# Patient Record
Sex: FEMALE | Race: WHITE | NOT HISPANIC OR LATINO | Employment: FULL TIME | URBAN - METROPOLITAN AREA
[De-identification: names, ages, dates, MRNs, and addresses within clinical notes are randomized per-mention and may not be internally consistent; named-entity substitution may affect disease eponyms.]

---

## 2023-10-30 ENCOUNTER — TELEPHONE (OUTPATIENT)
Dept: PRIMARY CARE | Facility: CLINIC | Age: 43
End: 2023-10-30
Payer: COMMERCIAL

## 2023-10-30 DIAGNOSIS — Z00.00 WELL ADULT EXAM: Primary | ICD-10-CM

## 2023-11-27 ENCOUNTER — TELEPHONE (OUTPATIENT)
Dept: PRIMARY CARE | Facility: CLINIC | Age: 43
End: 2023-11-27
Payer: COMMERCIAL

## 2023-11-27 DIAGNOSIS — J45.20 MILD INTERMITTENT ASTHMA WITHOUT COMPLICATION (HHS-HCC): Primary | ICD-10-CM

## 2023-11-27 RX ORDER — MONTELUKAST SODIUM 10 MG/1
10 TABLET ORAL DAILY
COMMUNITY
End: 2023-11-27 | Stop reason: SDUPTHER

## 2023-11-27 RX ORDER — MONTELUKAST SODIUM 10 MG/1
10 TABLET ORAL DAILY
Qty: 90 TABLET | Refills: 0 | Status: SHIPPED | OUTPATIENT
Start: 2023-11-27 | End: 2023-12-15 | Stop reason: SDUPTHER

## 2023-11-27 NOTE — TELEPHONE ENCOUNTER
Requesting refill of montelukast 10 mg be sent to [facility], going out of town tomorrow and would like it sent today

## 2023-12-15 ENCOUNTER — OFFICE VISIT (OUTPATIENT)
Dept: PRIMARY CARE | Facility: CLINIC | Age: 43
End: 2023-12-15
Payer: COMMERCIAL

## 2023-12-15 VITALS
TEMPERATURE: 98.1 F | HEART RATE: 89 BPM | DIASTOLIC BLOOD PRESSURE: 82 MMHG | WEIGHT: 293 LBS | BODY MASS INDEX: 45.99 KG/M2 | SYSTOLIC BLOOD PRESSURE: 128 MMHG | OXYGEN SATURATION: 99 % | HEIGHT: 67 IN

## 2023-12-15 DIAGNOSIS — Z12.31 ENCOUNTER FOR SCREENING MAMMOGRAM FOR MALIGNANT NEOPLASM OF BREAST: ICD-10-CM

## 2023-12-15 DIAGNOSIS — Z00.00 ANNUAL PHYSICAL EXAM: Primary | ICD-10-CM

## 2023-12-15 DIAGNOSIS — Z80.3 FAMILY HISTORY OF BREAST CANCER: ICD-10-CM

## 2023-12-15 DIAGNOSIS — Z30.9 ENCOUNTER FOR CONTRACEPTIVE MANAGEMENT, UNSPECIFIED TYPE: ICD-10-CM

## 2023-12-15 DIAGNOSIS — F41.1 GENERALIZED ANXIETY DISORDER: ICD-10-CM

## 2023-12-15 DIAGNOSIS — Z23 ENCOUNTER FOR IMMUNIZATION: ICD-10-CM

## 2023-12-15 DIAGNOSIS — J45.20 MILD INTERMITTENT ASTHMA WITHOUT COMPLICATION (HHS-HCC): ICD-10-CM

## 2023-12-15 PROCEDURE — 90715 TDAP VACCINE 7 YRS/> IM: CPT

## 2023-12-15 PROCEDURE — 1036F TOBACCO NON-USER: CPT

## 2023-12-15 PROCEDURE — 90471 IMMUNIZATION ADMIN: CPT

## 2023-12-15 PROCEDURE — 99396 PREV VISIT EST AGE 40-64: CPT

## 2023-12-15 RX ORDER — NORETHINDRONE AND ETHINYL ESTRADIOL 1 MG-35MCG
1 KIT ORAL DAILY
Qty: 84 TABLET | Refills: 0 | Status: SHIPPED | OUTPATIENT
Start: 2023-12-15

## 2023-12-15 RX ORDER — BUPROPION HYDROCHLORIDE 300 MG/1
TABLET ORAL EVERY 24 HOURS
COMMUNITY
Start: 2023-06-12 | End: 2023-12-15 | Stop reason: SDUPTHER

## 2023-12-15 RX ORDER — CITALOPRAM 10 MG/1
10 TABLET ORAL DAILY
Qty: 90 TABLET | Refills: 3 | Status: SHIPPED | OUTPATIENT
Start: 2023-12-15

## 2023-12-15 RX ORDER — BUPROPION HYDROCHLORIDE 300 MG/1
300 TABLET ORAL EVERY 24 HOURS
Qty: 30 TABLET | Refills: 11 | Status: SHIPPED | OUTPATIENT
Start: 2023-12-15

## 2023-12-15 RX ORDER — CETIRIZINE HYDROCHLORIDE 10 MG/1
10 TABLET ORAL
COMMUNITY
Start: 2012-09-24

## 2023-12-15 RX ORDER — NORETHINDRONE AND ETHINYL ESTRADIOL 1 MG-35MCG
1 KIT ORAL DAILY
COMMUNITY
End: 2023-12-15 | Stop reason: SDUPTHER

## 2023-12-15 RX ORDER — CITALOPRAM 10 MG/1
TABLET ORAL
COMMUNITY
Start: 2014-07-14 | End: 2023-12-15 | Stop reason: SDUPTHER

## 2023-12-15 RX ORDER — MONTELUKAST SODIUM 10 MG/1
10 TABLET ORAL DAILY
Qty: 90 TABLET | Refills: 3 | Status: SHIPPED | OUTPATIENT
Start: 2023-12-15

## 2023-12-15 ASSESSMENT — PROMIS GLOBAL HEALTH SCALE
RATE_GENERAL_HEALTH: VERY GOOD
RATE_AVERAGE_FATIGUE: MODERATE
RATE_SOCIAL_SATISFACTION: VERY GOOD
RATE_MENTAL_HEALTH: GOOD
RATE_QUALITY_OF_LIFE: GOOD
RATE_PHYSICAL_HEALTH: GOOD
EMOTIONAL_PROBLEMS: OFTEN
RATE_AVERAGE_PAIN: 3
CARRYOUT_SOCIAL_ACTIVITIES: VERY GOOD
CARRYOUT_PHYSICAL_ACTIVITIES: COMPLETELY

## 2023-12-15 ASSESSMENT — PAIN SCALES - GENERAL: PAINLEVEL: 0-NO PAIN

## 2023-12-15 ASSESSMENT — PATIENT HEALTH QUESTIONNAIRE - PHQ9
1. LITTLE INTEREST OR PLEASURE IN DOING THINGS: NOT AT ALL
SUM OF ALL RESPONSES TO PHQ9 QUESTIONS 1 AND 2: 0
2. FEELING DOWN, DEPRESSED OR HOPELESS: NOT AT ALL

## 2023-12-15 NOTE — PROGRESS NOTES
Subjective   Patient ID: Madelyn Arias is a 43 y.o. female who presents for Annual Exam (Sees OB ).    HPI   Madelyn presents for her annual physical exam. No new concerns at this visit.  No recent hospitalizations or illness.     Patient's recent visit notes, medication and allergy lists, past medical surgical social hx, immunization, vitals, problem list, recent tests were reviewed by me for pertinence to this visit.    PMH:  Mild intermittent asthma- environmental allergy related, Singular and Zyrtec daily, Proair as needed- used 1-2 times per year    MARIANA- Using Celexa and Wellbutrin XL for anxiety control, works well per patient without any side effects     Social Hx:  , one son  Smoking:  Former, quit   Alcohol: Rarely  Recreational drug use: Denies      Screening tools:  EK, normal  Mammogram: UTD 2023  Mother breast cancer (stage 2 in remission now). Patient does not currently meet NCCN criteria. Tyrer-Cuzick risk score is 27.1%. She is getting Mamm/MRI breast q 6 months.     PAP: GYN- sees [facility] provider. Pap  and was normal. She will make appointment for repeat pap/HPV as she is over due.     Vaccinations:  Tdap: Due 2023  Flu Vaccine: UTD 2023      Review of Systems   Constitutional:  Negative for activity change, appetite change, chills, fatigue, fever and unexpected weight change.   HENT:  Negative for dental problem, ear pain, hearing loss, mouth sores, nosebleeds, sinus pressure, sinus pain, sore throat and trouble swallowing.    Eyes:  Negative for photophobia, pain and visual disturbance.   Respiratory:  Negative for cough, chest tightness, shortness of breath and wheezing.    Cardiovascular:  Negative for chest pain, palpitations and leg swelling.   Gastrointestinal:  Negative for abdominal pain, blood in stool, constipation, diarrhea, nausea and vomiting.   Endocrine: Negative for cold intolerance, heat intolerance, polydipsia, polyphagia and polyuria.  "  Genitourinary:  Negative for decreased urine volume, dysuria, frequency and menstrual problem.   Musculoskeletal:  Negative for arthralgias, gait problem, joint swelling and myalgias.   Skin:  Negative for color change, rash and wound.   Allergic/Immunologic: Negative for environmental allergies and food allergies.   Neurological:  Negative for dizziness, seizures, syncope, speech difficulty, weakness, numbness and headaches.   Hematological:  Negative for adenopathy. Does not bruise/bleed easily.   Psychiatric/Behavioral:  Negative for behavioral problems, decreased concentration, self-injury, sleep disturbance and suicidal ideas. The patient is not nervous/anxious and is not hyperactive.          Objective   Blood Pressure 128/82 (BP Location: Left arm)   Pulse 89   Temperature 36.7 °C (98.1 °F) (Temporal)   Height 1.702 m (5' 7\")   Weight 137 kg (302 lb)   Oxygen Saturation 99%   Body Mass Index 47.30 kg/m²     Physical Exam  Vitals and nursing note reviewed.   Constitutional:       General: She is not in acute distress.     Appearance: Normal appearance. She is well-developed and well-groomed. She is morbidly obese. She is not ill-appearing.   HENT:      Head: Normocephalic.      Jaw: There is normal jaw occlusion.      Right Ear: Hearing, tympanic membrane, ear canal and external ear normal.      Left Ear: Hearing, tympanic membrane, ear canal and external ear normal.      Nose: Nose normal.      Mouth/Throat:      Lips: Pink.      Mouth: Mucous membranes are moist.      Pharynx: Oropharynx is clear. Uvula midline.   Eyes:      General: Lids are normal. Vision grossly intact. Gaze aligned appropriately.      Extraocular Movements: Extraocular movements intact.      Conjunctiva/sclera: Conjunctivae normal.      Pupils: Pupils are equal, round, and reactive to light.   Neck:      Thyroid: No thyromegaly.      Vascular: No carotid bruit or JVD.      Trachea: Trachea and phonation normal.   Cardiovascular: "      Rate and Rhythm: Normal rate and regular rhythm.      Pulses: Normal pulses.      Heart sounds: Normal heart sounds, S1 normal and S2 normal.   Pulmonary:      Effort: Pulmonary effort is normal.      Breath sounds: Normal breath sounds and air entry.   Abdominal:      General: Bowel sounds are normal. There is no distension.      Palpations: Abdomen is soft. There is no hepatomegaly, splenomegaly or mass.      Tenderness: There is no abdominal tenderness. There is no right CVA tenderness, left CVA tenderness or guarding.   Musculoskeletal:         General: Normal range of motion.      Cervical back: Normal, full passive range of motion without pain, normal range of motion and neck supple.      Thoracic back: Normal.      Lumbar back: Normal.   Lymphadenopathy:      Head:      Right side of head: No submental, submandibular, tonsillar, preauricular, posterior auricular or occipital adenopathy.      Left side of head: No submental, submandibular, tonsillar, preauricular, posterior auricular or occipital adenopathy.      Cervical: No cervical adenopathy.      Right cervical: No superficial or posterior cervical adenopathy.     Left cervical: No superficial or posterior cervical adenopathy.      Upper Body:      Right upper body: No supraclavicular adenopathy.      Left upper body: No supraclavicular adenopathy.   Skin:     General: Skin is warm and dry.      Capillary Refill: Capillary refill takes less than 2 seconds.   Neurological:      General: No focal deficit present.      Mental Status: She is alert and oriented to person, place, and time.      Cranial Nerves: Cranial nerves 2-12 are intact.      Sensory: Sensation is intact.      Motor: Motor function is intact.      Coordination: Coordination is intact.      Gait: Gait is intact.   Psychiatric:         Attention and Perception: Attention and perception normal.         Mood and Affect: Mood and affect normal.         Speech: Speech normal.          Behavior: Behavior normal. Behavior is cooperative.         Thought Content: Thought content normal.         Cognition and Memory: Cognition normal.         Judgment: Judgment normal.           Assessment/Plan   Problem List Items Addressed This Visit    None  Visit Diagnoses       Diagnosis Codes    Annual physical exam    -  Primary  Well adult exam.  1. Preventative measures reviewed.   2. Encouraged healthy diet and exercise.  3. Immunizations-Reviewed and updated at this visit  4. Labs- reviewed with patient  5. Medications-  Reviewed and updated    *Follow-up in 1 year for repeat annual physical exam. Patient verbalizes understanding  regarding plan of care and all questions answered.   Z00.00    Generalized anxiety disorder      Anxiety well controlled. Refills sent to pharmacy.   Discussed medication desired effects, potential side effects, and how to administer the medication.   Discussed non-pharmacological interventions such seeing a therapist, stress reduction, diet, exercise, and sleep.   Follow up in 6 months or sooner if needed.   Patient verbalizes understanding regarding plan of care and all questions answered.   F41.1    Relevant Medications    buPROPion XL (Wellbutrin XL) 300 mg 24 hr tablet    citalopram (CeleXA) 10 mg tablet    Mild intermittent asthma without complication     J45.20        Chronic condition, stable  Refills sent to pharmacy.   Discussed medication desired effects, potential side effects, and how to administer the medication.   Follow up 6 months or sooner if needed.   Patient verbalizes understanding regarding plan of care and all questions answered.      montelukast (Singulair) 10 mg tablet    Encounter for screening mammogram for malignant neoplasm of breast     Z12.31    Relevant Orders    BI mammo bilateral screening tomosynthesis    MR breast bilateral w contrast full protocol    Family history of breast cancer     Z80.3    Relevant Orders    MR breast bilateral w contrast  full protocol    Encounter for immunization     Z23    Relevant Orders    Tdap vaccine, age 7 years and older  (BOOSTRIX) (Completed)    Encounter for contraceptive management, unspecified type     Z30.9    Relevant Medications    Alyacen 1/35, 28, 1-35 mg-mcg tablet

## 2023-12-17 ASSESSMENT — ENCOUNTER SYMPTOMS
HYPERACTIVE: 0
SINUS PRESSURE: 0
APPETITE CHANGE: 0
NERVOUS/ANXIOUS: 0
MYALGIAS: 0
DIZZINESS: 0
CHILLS: 0
WEAKNESS: 0
VOMITING: 0
CONSTIPATION: 0
SHORTNESS OF BREATH: 0
DIARRHEA: 0
TROUBLE SWALLOWING: 0
UNEXPECTED WEIGHT CHANGE: 0
SEIZURES: 0
POLYPHAGIA: 0
WHEEZING: 0
JOINT SWELLING: 0
COLOR CHANGE: 0
HEADACHES: 0
SPEECH DIFFICULTY: 0
PALPITATIONS: 0
SLEEP DISTURBANCE: 0
DECREASED CONCENTRATION: 0
ABDOMINAL PAIN: 0
ADENOPATHY: 0
WOUND: 0
SINUS PAIN: 0
ACTIVITY CHANGE: 0
FATIGUE: 0
BRUISES/BLEEDS EASILY: 0
NAUSEA: 0
NUMBNESS: 0
EYE PAIN: 0
BLOOD IN STOOL: 0
POLYDIPSIA: 0
SORE THROAT: 0
COUGH: 0
FEVER: 0
ARTHRALGIAS: 0
DYSURIA: 0
PHOTOPHOBIA: 0
FREQUENCY: 0
CHEST TIGHTNESS: 0

## 2023-12-17 ASSESSMENT — VISUAL ACUITY: OU: 1

## 2023-12-19 ENCOUNTER — LAB (OUTPATIENT)
Dept: LAB | Facility: LAB | Age: 43
End: 2023-12-19
Payer: COMMERCIAL

## 2023-12-19 DIAGNOSIS — R79.89 ABNORMAL CBC: ICD-10-CM

## 2023-12-19 DIAGNOSIS — Z00.00 WELL ADULT EXAM: ICD-10-CM

## 2023-12-19 DIAGNOSIS — R79.89 ABNORMAL CBC: Primary | ICD-10-CM

## 2023-12-19 LAB
ALBUMIN SERPL-MCNC: 3.8 G/DL (ref 3.5–5)
ALP BLD-CCNC: 98 U/L (ref 35–125)
ALT SERPL-CCNC: 13 U/L (ref 5–40)
ANION GAP SERPL CALC-SCNC: 14 MMOL/L
APPEARANCE UR: ABNORMAL
AST SERPL-CCNC: 14 U/L (ref 5–40)
BACTERIA #/AREA URNS AUTO: ABNORMAL /HPF
BASOPHILS # BLD AUTO: 0.07 X10*3/UL (ref 0–0.1)
BASOPHILS NFR BLD AUTO: 1 %
BILIRUB SERPL-MCNC: <0.2 MG/DL (ref 0.1–1.2)
BILIRUB UR STRIP.AUTO-MCNC: NEGATIVE MG/DL
BUN SERPL-MCNC: 13 MG/DL (ref 8–25)
CALCIUM SERPL-MCNC: 8.9 MG/DL (ref 8.5–10.4)
CHLORIDE SERPL-SCNC: 104 MMOL/L (ref 97–107)
CHOLEST SERPL-MCNC: 184 MG/DL (ref 133–200)
CHOLEST/HDLC SERPL: 3.1 {RATIO}
CO2 SERPL-SCNC: 21 MMOL/L (ref 24–31)
COLOR UR: ABNORMAL
CREAT SERPL-MCNC: 1 MG/DL (ref 0.4–1.6)
EOSINOPHIL # BLD AUTO: 0.47 X10*3/UL (ref 0–0.7)
EOSINOPHIL NFR BLD AUTO: 6.6 %
ERYTHROCYTE [DISTWIDTH] IN BLOOD BY AUTOMATED COUNT: 15.7 % (ref 11.5–14.5)
GFR SERPL CREATININE-BSD FRML MDRD: 72 ML/MIN/1.73M*2
GLUCOSE SERPL-MCNC: 101 MG/DL (ref 65–99)
GLUCOSE UR STRIP.AUTO-MCNC: NORMAL MG/DL
HCT VFR BLD AUTO: 39.2 % (ref 36–46)
HDLC SERPL-MCNC: 59 MG/DL
HGB BLD-MCNC: 12 G/DL (ref 12–16)
IMM GRANULOCYTES # BLD AUTO: 0.01 X10*3/UL (ref 0–0.7)
IMM GRANULOCYTES NFR BLD AUTO: 0.1 % (ref 0–0.9)
IRON SATN MFR SERPL: 8 % (ref 12–50)
IRON SERPL-MCNC: 29 UG/DL (ref 30–160)
KETONES UR STRIP.AUTO-MCNC: NEGATIVE MG/DL
LDLC SERPL CALC-MCNC: 107 MG/DL (ref 65–130)
LEUKOCYTE ESTERASE UR QL STRIP.AUTO: ABNORMAL
LYMPHOCYTES # BLD AUTO: 1.79 X10*3/UL (ref 1.2–4.8)
LYMPHOCYTES NFR BLD AUTO: 25.3 %
MCH RBC QN AUTO: 24.1 PG (ref 26–34)
MCHC RBC AUTO-ENTMCNC: 30.6 G/DL (ref 32–36)
MCV RBC AUTO: 79 FL (ref 80–100)
MONOCYTES # BLD AUTO: 0.63 X10*3/UL (ref 0.1–1)
MONOCYTES NFR BLD AUTO: 8.9 %
MUCOUS THREADS #/AREA URNS AUTO: ABNORMAL /LPF
NEUTROPHILS # BLD AUTO: 4.11 X10*3/UL (ref 1.2–7.7)
NEUTROPHILS NFR BLD AUTO: 58.1 %
NITRITE UR QL STRIP.AUTO: NEGATIVE
NRBC BLD-RTO: 0 /100 WBCS (ref 0–0)
PH UR STRIP.AUTO: 5.5 [PH]
PLATELET # BLD AUTO: 237 X10*3/UL (ref 150–450)
POTASSIUM SERPL-SCNC: 4.4 MMOL/L (ref 3.4–5.1)
PROT SERPL-MCNC: 6.2 G/DL (ref 5.9–7.9)
PROT UR STRIP.AUTO-MCNC: ABNORMAL MG/DL
RBC # BLD AUTO: 4.97 X10*6/UL (ref 4–5.2)
RBC # UR STRIP.AUTO: ABNORMAL /UL
RBC #/AREA URNS AUTO: ABNORMAL /HPF
SODIUM SERPL-SCNC: 139 MMOL/L (ref 133–145)
SP GR UR STRIP.AUTO: 1.03
SQUAMOUS #/AREA URNS AUTO: ABNORMAL /HPF
TIBC SERPL-MCNC: 379 UG/DL (ref 228–428)
TRIGL SERPL-MCNC: 88 MG/DL (ref 40–150)
UIBC SERPL-MCNC: 350 UG/DL (ref 110–370)
UROBILINOGEN UR STRIP.AUTO-MCNC: NORMAL MG/DL
WBC # BLD AUTO: 7.1 X10*3/UL (ref 4.4–11.3)
WBC #/AREA URNS AUTO: ABNORMAL /HPF
WBC CLUMPS #/AREA URNS AUTO: ABNORMAL /HPF

## 2023-12-19 PROCEDURE — 85025 COMPLETE CBC W/AUTO DIFF WBC: CPT

## 2023-12-19 PROCEDURE — 83550 IRON BINDING TEST: CPT

## 2023-12-19 PROCEDURE — 80053 COMPREHEN METABOLIC PANEL: CPT

## 2023-12-19 PROCEDURE — 36415 COLL VENOUS BLD VENIPUNCTURE: CPT

## 2023-12-19 PROCEDURE — 80061 LIPID PANEL: CPT

## 2023-12-19 PROCEDURE — 83540 ASSAY OF IRON: CPT

## 2023-12-19 PROCEDURE — 81001 URINALYSIS AUTO W/SCOPE: CPT

## 2024-01-19 PROCEDURE — 88141 CYTOPATH C/V INTERPRET: CPT | Performed by: PATHOLOGY

## 2024-01-19 PROCEDURE — 88142 CYTOPATH C/V THIN LAYER: CPT

## 2024-01-23 ENCOUNTER — LAB REQUISITION (OUTPATIENT)
Dept: LAB | Facility: HOSPITAL | Age: 44
End: 2024-01-23
Payer: COMMERCIAL

## 2024-01-23 DIAGNOSIS — Z01.419 ENCOUNTER FOR GYNECOLOGICAL EXAMINATION (GENERAL) (ROUTINE) WITHOUT ABNORMAL FINDINGS: ICD-10-CM

## 2024-01-23 DIAGNOSIS — Z11.51 ENCOUNTER FOR SCREENING FOR HUMAN PAPILLOMAVIRUS (HPV): ICD-10-CM

## 2024-01-23 DIAGNOSIS — Z12.4 ENCOUNTER FOR SCREENING FOR MALIGNANT NEOPLASM OF CERVIX: ICD-10-CM

## 2024-02-07 LAB
CYTOLOGY CMNT CVX/VAG CYTO-IMP: NORMAL
LAB AP HPV GENOTYPE QUESTION: YES
LAB AP HPV HR: NORMAL
LABORATORY COMMENT REPORT: NORMAL
LABORATORY COMMENT REPORT: NORMAL
LMP START DATE: NORMAL
PATH REPORT.TOTAL CANCER: NORMAL

## 2024-03-25 ENCOUNTER — APPOINTMENT (OUTPATIENT)
Dept: RADIOLOGY | Facility: HOSPITAL | Age: 44
End: 2024-03-25
Payer: COMMERCIAL

## 2024-04-05 ENCOUNTER — HOSPITAL ENCOUNTER (OUTPATIENT)
Dept: RADIOLOGY | Facility: HOSPITAL | Age: 44
Discharge: HOME | End: 2024-04-05
Payer: COMMERCIAL

## 2024-04-05 VITALS — WEIGHT: 293 LBS | BODY MASS INDEX: 45.99 KG/M2 | HEIGHT: 67 IN

## 2024-04-05 DIAGNOSIS — Z12.31 ENCOUNTER FOR SCREENING MAMMOGRAM FOR MALIGNANT NEOPLASM OF BREAST: ICD-10-CM

## 2024-04-05 PROCEDURE — 77067 SCR MAMMO BI INCL CAD: CPT

## 2024-04-05 PROCEDURE — 77067 SCR MAMMO BI INCL CAD: CPT | Performed by: STUDENT IN AN ORGANIZED HEALTH CARE EDUCATION/TRAINING PROGRAM

## 2024-04-05 PROCEDURE — 77063 BREAST TOMOSYNTHESIS BI: CPT | Performed by: STUDENT IN AN ORGANIZED HEALTH CARE EDUCATION/TRAINING PROGRAM

## 2024-04-09 ENCOUNTER — TELEPHONE (OUTPATIENT)
Dept: PRIMARY CARE | Facility: CLINIC | Age: 44
End: 2024-04-09
Payer: COMMERCIAL

## 2024-04-09 NOTE — TELEPHONE ENCOUNTER
Patient called [phone] she received results of her mammogram and they are requesting additional testing, not sure what is needed

## 2024-04-22 ENCOUNTER — HOSPITAL ENCOUNTER (OUTPATIENT)
Dept: RADIOLOGY | Facility: HOSPITAL | Age: 44
Discharge: HOME | End: 2024-04-22
Payer: COMMERCIAL

## 2024-04-22 DIAGNOSIS — R92.8 OTHER ABNORMAL AND INCONCLUSIVE FINDINGS ON DIAGNOSTIC IMAGING OF BREAST: ICD-10-CM

## 2024-04-22 PROCEDURE — 77061 BREAST TOMOSYNTHESIS UNI: CPT | Mod: LT

## 2024-04-22 PROCEDURE — 77065 DX MAMMO INCL CAD UNI: CPT | Mod: LEFT SIDE | Performed by: RADIOLOGY

## 2024-04-22 PROCEDURE — 77061 BREAST TOMOSYNTHESIS UNI: CPT | Mod: LEFT SIDE | Performed by: RADIOLOGY

## 2024-10-24 ENCOUNTER — APPOINTMENT (OUTPATIENT)
Dept: RADIOLOGY | Facility: HOSPITAL | Age: 44
End: 2024-10-24
Payer: COMMERCIAL

## 2024-10-25 ENCOUNTER — TELEPHONE (OUTPATIENT)
Dept: PRIMARY CARE | Facility: CLINIC | Age: 44
End: 2024-10-25
Payer: COMMERCIAL

## 2024-10-25 DIAGNOSIS — Z00.00 ANNUAL PHYSICAL EXAM: ICD-10-CM

## 2024-10-25 NOTE — TELEPHONE ENCOUNTER
Ft 11/8/24  CPE    Patient is asking if tests for her cortizone and hormone levels can be added as well?

## 2024-11-01 ENCOUNTER — LAB (OUTPATIENT)
Dept: LAB | Facility: LAB | Age: 44
End: 2024-11-01
Payer: COMMERCIAL

## 2024-11-01 DIAGNOSIS — Z00.00 ANNUAL PHYSICAL EXAM: ICD-10-CM

## 2024-11-01 LAB
ALBUMIN SERPL BCP-MCNC: 3.6 G/DL (ref 3.4–5)
ALP SERPL-CCNC: 93 U/L (ref 33–110)
ALT SERPL W P-5'-P-CCNC: 19 U/L (ref 7–45)
ANION GAP SERPL CALCULATED.3IONS-SCNC: 11 MMOL/L (ref 10–20)
APPEARANCE UR: CLEAR
AST SERPL W P-5'-P-CCNC: 14 U/L (ref 9–39)
BASOPHILS # BLD AUTO: 0.08 X10*3/UL (ref 0–0.1)
BASOPHILS NFR BLD AUTO: 1 %
BILIRUB SERPL-MCNC: 0.4 MG/DL (ref 0–1.2)
BILIRUB UR STRIP.AUTO-MCNC: NEGATIVE MG/DL
BUN SERPL-MCNC: 12 MG/DL (ref 6–23)
CALCIUM SERPL-MCNC: 8.9 MG/DL (ref 8.6–10.3)
CHLORIDE SERPL-SCNC: 106 MMOL/L (ref 98–107)
CHOLEST SERPL-MCNC: 179 MG/DL (ref 0–199)
CHOLEST/HDLC SERPL: 3.3 {RATIO}
CO2 SERPL-SCNC: 26 MMOL/L (ref 21–32)
COLOR UR: YELLOW
CREAT SERPL-MCNC: 0.86 MG/DL (ref 0.5–1.05)
EGFRCR SERPLBLD CKD-EPI 2021: 86 ML/MIN/1.73M*2
EOSINOPHIL # BLD AUTO: 0.51 X10*3/UL (ref 0–0.7)
EOSINOPHIL NFR BLD AUTO: 6.2 %
ERYTHROCYTE [DISTWIDTH] IN BLOOD BY AUTOMATED COUNT: 14.7 % (ref 11.5–14.5)
EST. AVERAGE GLUCOSE BLD GHB EST-MCNC: 103 MG/DL
GLUCOSE SERPL-MCNC: 90 MG/DL (ref 74–99)
GLUCOSE UR STRIP.AUTO-MCNC: NORMAL MG/DL
HBA1C MFR BLD: 5.2 %
HCT VFR BLD AUTO: 36.9 % (ref 36–46)
HCV AB SER QL: NONREACTIVE
HDLC SERPL-MCNC: 53.8 MG/DL
HGB BLD-MCNC: 11.7 G/DL (ref 12–16)
IMM GRANULOCYTES # BLD AUTO: 0.02 X10*3/UL (ref 0–0.7)
IMM GRANULOCYTES NFR BLD AUTO: 0.2 % (ref 0–0.9)
KETONES UR STRIP.AUTO-MCNC: NEGATIVE MG/DL
LDLC SERPL CALC-MCNC: 103 MG/DL
LEUKOCYTE ESTERASE UR QL STRIP.AUTO: NEGATIVE
LYMPHOCYTES # BLD AUTO: 2.49 X10*3/UL (ref 1.2–4.8)
LYMPHOCYTES NFR BLD AUTO: 30.1 %
MCH RBC QN AUTO: 24.9 PG (ref 26–34)
MCHC RBC AUTO-ENTMCNC: 31.7 G/DL (ref 32–36)
MCV RBC AUTO: 79 FL (ref 80–100)
MONOCYTES # BLD AUTO: 0.47 X10*3/UL (ref 0.1–1)
MONOCYTES NFR BLD AUTO: 5.7 %
MUCOUS THREADS #/AREA URNS AUTO: ABNORMAL /LPF
NEUTROPHILS # BLD AUTO: 4.69 X10*3/UL (ref 1.2–7.7)
NEUTROPHILS NFR BLD AUTO: 56.8 %
NITRITE UR QL STRIP.AUTO: NEGATIVE
NON HDL CHOLESTEROL: 125 MG/DL (ref 0–149)
NRBC BLD-RTO: 0 /100 WBCS (ref 0–0)
PH UR STRIP.AUTO: 6 [PH]
PLATELET # BLD AUTO: 248 X10*3/UL (ref 150–450)
POTASSIUM SERPL-SCNC: 3.9 MMOL/L (ref 3.5–5.3)
PROT SERPL-MCNC: 6.1 G/DL (ref 6.4–8.2)
PROT UR STRIP.AUTO-MCNC: NORMAL MG/DL
RBC # BLD AUTO: 4.69 X10*6/UL (ref 4–5.2)
RBC # UR STRIP.AUTO: NEGATIVE /UL
RBC #/AREA URNS AUTO: ABNORMAL /HPF
SODIUM SERPL-SCNC: 139 MMOL/L (ref 136–145)
SP GR UR STRIP.AUTO: 1.02
SQUAMOUS #/AREA URNS AUTO: ABNORMAL /HPF
TRIGL SERPL-MCNC: 111 MG/DL (ref 0–149)
UROBILINOGEN UR STRIP.AUTO-MCNC: NORMAL MG/DL
VLDL: 22 MG/DL (ref 0–40)
WBC # BLD AUTO: 8.3 X10*3/UL (ref 4.4–11.3)
WBC #/AREA URNS AUTO: ABNORMAL /HPF

## 2024-11-01 PROCEDURE — 86803 HEPATITIS C AB TEST: CPT

## 2024-11-01 PROCEDURE — 80053 COMPREHEN METABOLIC PANEL: CPT

## 2024-11-01 PROCEDURE — 36415 COLL VENOUS BLD VENIPUNCTURE: CPT

## 2024-11-01 PROCEDURE — 81001 URINALYSIS AUTO W/SCOPE: CPT

## 2024-11-01 PROCEDURE — 85025 COMPLETE CBC W/AUTO DIFF WBC: CPT

## 2024-11-01 PROCEDURE — 80061 LIPID PANEL: CPT

## 2024-11-01 PROCEDURE — 83036 HEMOGLOBIN GLYCOSYLATED A1C: CPT

## 2024-11-05 ENCOUNTER — HOSPITAL ENCOUNTER (OUTPATIENT)
Dept: RADIOLOGY | Facility: HOSPITAL | Age: 44
Discharge: HOME | End: 2024-11-05
Payer: COMMERCIAL

## 2024-11-05 DIAGNOSIS — Z12.31 ENCOUNTER FOR SCREENING MAMMOGRAM FOR MALIGNANT NEOPLASM OF BREAST: ICD-10-CM

## 2024-11-05 DIAGNOSIS — Z80.3 FAMILY HISTORY OF BREAST CANCER: ICD-10-CM

## 2024-11-05 PROCEDURE — 77049 MRI BREAST C-+ W/CAD BI: CPT

## 2024-11-05 PROCEDURE — 2550000001 HC RX 255 CONTRASTS

## 2024-11-05 PROCEDURE — A9575 INJ GADOTERATE MEGLUMI 0.1ML: HCPCS

## 2024-11-05 PROCEDURE — 77049 MRI BREAST C-+ W/CAD BI: CPT | Performed by: STUDENT IN AN ORGANIZED HEALTH CARE EDUCATION/TRAINING PROGRAM

## 2024-11-05 RX ORDER — GADOTERATE MEGLUMINE 376.9 MG/ML
15 INJECTION INTRAVENOUS
Status: COMPLETED | OUTPATIENT
Start: 2024-11-05 | End: 2024-11-05

## 2024-11-05 RX ORDER — GADOTERATE MEGLUMINE 376.9 MG/ML
15 INJECTION INTRAVENOUS
Status: DISCONTINUED | OUTPATIENT
Start: 2024-11-05 | End: 2024-11-06 | Stop reason: HOSPADM

## 2024-11-08 ENCOUNTER — APPOINTMENT (OUTPATIENT)
Dept: PRIMARY CARE | Facility: CLINIC | Age: 44
End: 2024-11-08
Payer: COMMERCIAL

## 2024-11-08 ENCOUNTER — LAB (OUTPATIENT)
Dept: LAB | Facility: LAB | Age: 44
End: 2024-11-08
Payer: COMMERCIAL

## 2024-11-08 VITALS
DIASTOLIC BLOOD PRESSURE: 80 MMHG | BODY MASS INDEX: 45.99 KG/M2 | TEMPERATURE: 97.4 F | OXYGEN SATURATION: 98 % | SYSTOLIC BLOOD PRESSURE: 114 MMHG | HEIGHT: 67 IN | WEIGHT: 293 LBS | HEART RATE: 78 BPM

## 2024-11-08 DIAGNOSIS — E66.813 CLASS 3 SEVERE OBESITY WITH BODY MASS INDEX (BMI) OF 45.0 TO 49.9 IN ADULT, UNSPECIFIED OBESITY TYPE, UNSPECIFIED WHETHER SERIOUS COMORBIDITY PRESENT: ICD-10-CM

## 2024-11-08 DIAGNOSIS — E66.01 CLASS 3 SEVERE OBESITY WITH BODY MASS INDEX (BMI) OF 45.0 TO 49.9 IN ADULT, UNSPECIFIED OBESITY TYPE, UNSPECIFIED WHETHER SERIOUS COMORBIDITY PRESENT: ICD-10-CM

## 2024-11-08 DIAGNOSIS — D50.9 IRON DEFICIENCY ANEMIA, UNSPECIFIED IRON DEFICIENCY ANEMIA TYPE: ICD-10-CM

## 2024-11-08 DIAGNOSIS — F41.1 GENERALIZED ANXIETY DISORDER: ICD-10-CM

## 2024-11-08 DIAGNOSIS — J45.20 MILD INTERMITTENT ASTHMA WITHOUT COMPLICATION (HHS-HCC): ICD-10-CM

## 2024-11-08 DIAGNOSIS — Z00.00 WELL ADULT EXAM: Primary | ICD-10-CM

## 2024-11-08 DIAGNOSIS — R53.83 OTHER FATIGUE: ICD-10-CM

## 2024-11-08 PROBLEM — K52.9 GASTROENTERITIS: Status: RESOLVED | Noted: 2021-08-27 | Resolved: 2024-11-08

## 2024-11-08 PROBLEM — K04.7 PERIAPICAL ABSCESS: Status: RESOLVED | Noted: 2021-08-02 | Resolved: 2024-11-08

## 2024-11-08 PROBLEM — L03.312 CELLULITIS OF UPPER BACK EXCLUDING SCAPULAR REGION: Status: RESOLVED | Noted: 2022-11-23 | Resolved: 2024-11-08

## 2024-11-08 PROBLEM — J30.9 ALLERGIC RHINITIS: Status: RESOLVED | Noted: 2024-11-08 | Resolved: 2024-11-08

## 2024-11-08 PROBLEM — M79.671 PAIN IN RIGHT FOOT: Status: RESOLVED | Noted: 2020-11-19 | Resolved: 2024-11-08

## 2024-11-08 PROBLEM — M79.89 SWELLING OF RIGHT FOOT: Status: RESOLVED | Noted: 2020-11-19 | Resolved: 2024-11-08

## 2024-11-08 PROBLEM — R92.30 BREAST DENSITY: Status: RESOLVED | Noted: 2022-07-20 | Resolved: 2024-11-08

## 2024-11-08 PROBLEM — M54.40 CHRONIC LOW BACK PAIN WITH SCIATICA: Status: ACTIVE | Noted: 2021-12-14

## 2024-11-08 PROBLEM — L72.3 SEBACEOUS CYST: Status: RESOLVED | Noted: 2023-06-29 | Resolved: 2024-11-08

## 2024-11-08 PROBLEM — G89.29 CHRONIC LOW BACK PAIN WITH SCIATICA: Status: ACTIVE | Noted: 2021-12-14

## 2024-11-08 PROBLEM — J06.9 URI WITH COUGH AND CONGESTION: Status: RESOLVED | Noted: 2020-07-28 | Resolved: 2024-11-08
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
## 2024-11-08 PROBLEM — R05.8 DRY COUGH: Status: RESOLVED | Noted: 2019-01-04 | Resolved: 2024-11-08

## 2024-11-08 PROBLEM — B37.9 CANDIDIASIS: Status: RESOLVED | Noted: 2023-06-29 | Resolved: 2024-11-08

## 2024-11-08 PROBLEM — D64.9 ANEMIA: Status: ACTIVE | Noted: 2020-11-24

## 2024-11-08 PROBLEM — M54.9 BACKACHE: Status: RESOLVED | Noted: 2024-11-08 | Resolved: 2024-11-08

## 2024-11-08 PROBLEM — M54.41 ACUTE BILATERAL LOW BACK PAIN WITH RIGHT-SIDED SCIATICA: Status: RESOLVED | Noted: 2022-01-10 | Resolved: 2024-11-08

## 2024-11-08 PROBLEM — T81.89XA SUTURE GRANULOMA: Status: RESOLVED | Noted: 2024-11-08 | Resolved: 2024-11-08

## 2024-11-08 PROBLEM — J32.4 PANSINUSITIS: Status: RESOLVED | Noted: 2020-02-25 | Resolved: 2024-11-08

## 2024-11-08 PROBLEM — K04.7 TOOTH ABSCESS: Status: RESOLVED | Noted: 2021-08-02 | Resolved: 2024-11-08

## 2024-11-08 PROBLEM — J30.2 SEASONAL ALLERGIES: Status: ACTIVE | Noted: 2019-08-06

## 2024-11-08 PROBLEM — J40 BRONCHITIS: Status: RESOLVED | Noted: 2019-01-04 | Resolved: 2024-11-08

## 2024-11-08 PROBLEM — M79.9 DISORDER OF SOFT TISSUE: Status: ACTIVE | Noted: 2020-11-19

## 2024-11-08 PROBLEM — F33.8 SEASONAL DEPRESSION (CMS-HCC): Status: ACTIVE | Noted: 2018-10-02

## 2024-11-08 PROBLEM — J06.9 UPPER RESPIRATORY TRACT INFECTION: Status: RESOLVED | Noted: 2020-07-28 | Resolved: 2024-11-08

## 2024-11-08 PROBLEM — B37.9 YEAST INFECTION: Status: RESOLVED | Noted: 2023-06-29 | Resolved: 2024-11-08

## 2024-11-08 PROBLEM — L03.312 CELLULITIS OF SKIN OF BACK: Status: RESOLVED | Noted: 2022-11-23 | Resolved: 2024-11-08

## 2024-11-08 PROBLEM — R09.81 NASAL CONGESTION: Status: RESOLVED | Noted: 2019-08-06 | Resolved: 2024-11-08

## 2024-11-08 PROBLEM — S16.1XXA STRAIN OF NECK MUSCLE: Status: RESOLVED | Noted: 2024-11-08 | Resolved: 2024-11-08

## 2024-11-08 LAB
25(OH)D3 SERPL-MCNC: 60 NG/ML (ref 30–100)
FERRITIN SERPL-MCNC: 15 NG/ML (ref 8–150)
IRON SATN MFR SERPL: 12 % (ref 25–45)
IRON SERPL-MCNC: 48 UG/DL (ref 35–150)
TIBC SERPL-MCNC: 385 UG/DL (ref 240–445)
TSH SERPL-ACNC: 1.72 MIU/L (ref 0.44–3.98)
UIBC SERPL-MCNC: 337 UG/DL (ref 110–370)

## 2024-11-08 PROCEDURE — 83540 ASSAY OF IRON: CPT

## 2024-11-08 PROCEDURE — 83550 IRON BINDING TEST: CPT

## 2024-11-08 PROCEDURE — 1036F TOBACCO NON-USER: CPT

## 2024-11-08 PROCEDURE — 99396 PREV VISIT EST AGE 40-64: CPT

## 2024-11-08 PROCEDURE — 82306 VITAMIN D 25 HYDROXY: CPT

## 2024-11-08 PROCEDURE — 3008F BODY MASS INDEX DOCD: CPT

## 2024-11-08 PROCEDURE — 84443 ASSAY THYROID STIM HORMONE: CPT

## 2024-11-08 PROCEDURE — 82728 ASSAY OF FERRITIN: CPT

## 2024-11-08 PROCEDURE — 36415 COLL VENOUS BLD VENIPUNCTURE: CPT

## 2024-11-08 PROCEDURE — 99214 OFFICE O/P EST MOD 30 MIN: CPT

## 2024-11-08 RX ORDER — CYANOCOBALAMIN (VITAMIN B-12) 250 MCG
250 TABLET ORAL DAILY
COMMUNITY

## 2024-11-08 RX ORDER — MULTIVITAMIN
1 TABLET ORAL
COMMUNITY
End: 2024-11-08 | Stop reason: WASHOUT

## 2024-11-08 RX ORDER — CITALOPRAM 10 MG/1
10 TABLET ORAL DAILY
Qty: 90 TABLET | Refills: 1 | Status: SHIPPED | OUTPATIENT
Start: 2024-11-08

## 2024-11-08 RX ORDER — MONTELUKAST SODIUM 10 MG/1
10 TABLET ORAL DAILY
Qty: 90 TABLET | Refills: 1 | Status: SHIPPED | OUTPATIENT
Start: 2024-11-08

## 2024-11-08 RX ORDER — BUPROPION HYDROCHLORIDE 300 MG/1
300 TABLET ORAL EVERY 24 HOURS
Qty: 30 TABLET | Refills: 5 | Status: SHIPPED | OUTPATIENT
Start: 2024-11-08

## 2024-11-08 ASSESSMENT — ENCOUNTER SYMPTOMS
VOMITING: 0
NAUSEA: 0
RHINORRHEA: 0
COUGH: 0
MYALGIAS: 0
UNEXPECTED WEIGHT CHANGE: 0
FEVER: 0
FATIGUE: 1
ABDOMINAL PAIN: 0
CHILLS: 0
NUMBNESS: 0
NERVOUS/ANXIOUS: 0
SHORTNESS OF BREATH: 0
WEAKNESS: 0
ARTHRALGIAS: 0
DYSPHORIC MOOD: 0
TROUBLE SWALLOWING: 0
HEMATURIA: 0
BLOOD IN STOOL: 0
SORE THROAT: 0
HEADACHES: 0
DYSURIA: 0

## 2024-11-08 ASSESSMENT — PROMIS GLOBAL HEALTH SCALE
RATE_AVERAGE_FATIGUE: SEVERE
EMOTIONAL_PROBLEMS: SOMETIMES
RATE_QUALITY_OF_LIFE: GOOD
RATE_SOCIAL_SATISFACTION: EXCELLENT
RATE_GENERAL_HEALTH: GOOD
RATE_PHYSICAL_HEALTH: GOOD
RATE_MENTAL_HEALTH: VERY GOOD
RATE_AVERAGE_PAIN: 6
CARRYOUT_SOCIAL_ACTIVITIES: VERY GOOD
CARRYOUT_PHYSICAL_ACTIVITIES: MOSTLY

## 2024-11-08 ASSESSMENT — COLUMBIA-SUICIDE SEVERITY RATING SCALE - C-SSRS
6. HAVE YOU EVER DONE ANYTHING, STARTED TO DO ANYTHING, OR PREPARED TO DO ANYTHING TO END YOUR LIFE?: NO
1. IN THE PAST MONTH, HAVE YOU WISHED YOU WERE DEAD OR WISHED YOU COULD GO TO SLEEP AND NOT WAKE UP?: NO
2. HAVE YOU ACTUALLY HAD ANY THOUGHTS OF KILLING YOURSELF?: NO

## 2024-11-08 NOTE — PROGRESS NOTES
Subjective   Patient ID: Kim Arias is a 43 y.o. female who presents for Annual Exam (EKG 12-10-20/Pt has been feeling very fatigue for a couple months now ).    HPI   Madelyn Arias is seen for her comprehensive physical exam. PMH, SH, FH, medications were reviewed and updated.     CHRONIC ISSUES:   Anxiety: On Wellbutrin XL 300mg, Celexa 10mg. Happy with effects. Denies SI, HI, self-harm.     Mild intermittent asthma: Takes Singulair and Zyrtec daily, Albuterol PRN (1-2x/year).     HLD: On no medications. Recent . ASCVD 0.6%.      Fatigue over past few months. Sleeps about 7-8 hours a night. Snores, dose not wake up feeling rested. No nighttime awakenings.     IMMUNIZATIONS:   Immunization History   Administered Date(s) Administered    Flu vaccine (IIV4), preservative free *Check age/dose* 12/22/2022    Flu vaccine, quadrivalent, no egg protein, age 6 month or greater (FLUCELVAX) 11/04/2023    Flu vaccine, trivalent, preservative free, age 6 months and greater (Fluarix/Fluzone/Flulaval) 10/25/2024    HPV, Quadrivalent 09/15/2013    HPV, Unspecified 09/15/2013    Influenza, Unspecified 10/02/2012, 10/01/2013    Influenza, injectable, quadrivalent 11/25/2020, 12/10/2021    Influenza, seasonal, injectable 10/24/2014    Moderna COVID-19 vaccine, 12 years and older (50mcg/0.5mL)(Spikevax) 11/04/2023    Pfizer COVID-19 vaccine, 12 years and older, (30mcg/0.3mL) (Comirnaty) 10/25/2024    Pfizer COVID-19 vaccine, bivalent, age 12 years and older (30 mcg/0.3 mL) 12/22/2022    Pfizer Purple Cap SARS-CoV-2 03/09/2021, 04/08/2021, 12/07/2021    Tdap vaccine, age 7 year and older (BOOSTRIX, ADACEL) 02/06/2013, 12/31/2013, 12/15/2023        LUNG CANCER SCREENING (50-77 y.o. with 20+ pack year hx & current smoker or quit <15yrs ago)  Social History     Tobacco Use   Smoking Status Former    Current packs/day: 0.00    Average packs/day: 1 pack/day for 10.0 years (10.0 ttl pk-yrs)    Types: Cigarettes    Start  "date: 2003    Quit date: 2013    Years since quittin.0   Smokeless Tobacco Never       COLORECTAL SCREENING (From 45 or 10yrs younger than family diagnosis)  Last colonoscopy - Never   Next colonoscopy due - 45  Relevant FHx - None    GYN  LMP - 10/27/24, regular   Last Pap - , NILM  Next Pap due - , follows with GYN    MAMMOGRAM SCREENING (From age 40)   Last mammogram -   Next mammogram due -     Sees eye doctor annually.   Skin check 10/24    Review of Systems   Constitutional:  Positive for fatigue. Negative for chills, fever and unexpected weight change.   HENT:  Negative for congestion, ear pain, hearing loss, rhinorrhea, sore throat and trouble swallowing.    Eyes:  Negative for visual disturbance.   Respiratory:  Negative for cough and shortness of breath.    Cardiovascular:  Negative for chest pain and leg swelling.   Gastrointestinal:  Negative for abdominal pain, blood in stool, nausea and vomiting.   Genitourinary:  Negative for dysuria and hematuria.   Musculoskeletal:  Negative for arthralgias and myalgias.   Skin:  Negative for rash.   Neurological:  Negative for weakness, numbness and headaches.   Psychiatric/Behavioral:  Negative for dysphoric mood. The patient is not nervous/anxious.        Objective   /80 (BP Location: Left arm, Patient Position: Sitting, BP Cuff Size: Adult)   Pulse 78   Temp 36.3 °C (97.4 °F) (Temporal)   Ht 1.702 m (5' 7\")   Wt 135 kg (298 lb)   SpO2 98%   BMI 46.67 kg/m²     Physical Exam  Vitals and nursing note reviewed.   Constitutional:       General: She is not in acute distress.     Appearance: She is obese.   HENT:      Right Ear: Tympanic membrane and ear canal normal.      Left Ear: Tympanic membrane and ear canal normal.      Nose: Nose normal.      Mouth/Throat:      Mouth: Mucous membranes are moist.   Eyes:      Extraocular Movements: Extraocular movements intact.      Conjunctiva/sclera: Conjunctivae normal.      " Pupils: Pupils are equal, round, and reactive to light.   Neck:      Vascular: No carotid bruit.   Cardiovascular:      Rate and Rhythm: Normal rate and regular rhythm.   Pulmonary:      Effort: Pulmonary effort is normal.      Breath sounds: Normal breath sounds.   Abdominal:      General: Abdomen is flat.      Palpations: Abdomen is soft.      Tenderness: There is no abdominal tenderness.   Musculoskeletal:         General: Normal range of motion.      Cervical back: Neck supple.   Lymphadenopathy:      Cervical: No cervical adenopathy.   Skin:     General: Skin is warm.      Findings: No rash.   Neurological:      General: No focal deficit present.      Mental Status: She is alert.   Psychiatric:         Mood and Affect: Mood normal.       Assessment/Plan   Assessment & Plan  Well adult exam  Preventative measures discussed. Labs reviewed with patient. Immunizations discussed.          Generalized anxiety disorder  Chronic, stable. Continue Wellbutrin XL 300mg, Celexa 10mg daily.   Orders:    buPROPion XL (Wellbutrin XL) 300 mg 24 hr tablet; Take 1 tablet (300 mg) by mouth once every 24 hours.    citalopram (CeleXA) 10 mg tablet; Take 1 tablet (10 mg) by mouth once daily.    Mild intermittent asthma without complication (HHS-HCC)  Chronic, stable. Continue   Orders:    montelukast (Singulair) 10 mg tablet; Take 1 tablet (10 mg) by mouth once daily.    Other fatigue  VENANCIO vs hypothyroidism vs SHERRI. Labs ordered, will follow up with results.     Orders:    Iron and TIBC; Future    Tsh With Reflex To Free T4 If Abnormal; Future    Vitamin D 25-Hydroxy,Total (for eval of Vitamin D levels); Future    Iron deficiency anemia, unspecified iron deficiency anemia type  Recommend daily iron supplementation. Take with Vitamin C rich foods for better absorption. Miralax as needed for constipation. Additional labs ordered. Recheck in 3-6 months.     Orders:    Ferritin; Future    Class 3 severe obesity with body mass index (BMI)  of 45.0 to 49.9 in adult, unspecified obesity type, unspecified whether serious comorbidity present  Chronic. Discussed GLP-1 injections including side effects, benefits. Patient interested in medication. Will complete additional lab work and start medication if normal. Encouraged diet low in processed foods, fast foods, carbohydrates and increase in lean protein, vegetables and exercise.

## 2024-11-08 NOTE — ASSESSMENT & PLAN NOTE
Recommend daily iron supplementation. Take with Vitamin C rich foods for better absorption. Miralax as needed for constipation. Additional labs ordered. Recheck in 3-6 months.     Orders:    Ferritin; Future

## 2025-02-14 DIAGNOSIS — J45.20 MILD INTERMITTENT ASTHMA WITHOUT COMPLICATION (HHS-HCC): ICD-10-CM

## 2025-02-14 DIAGNOSIS — F41.1 GENERALIZED ANXIETY DISORDER: ICD-10-CM

## 2025-02-14 RX ORDER — CITALOPRAM 10 MG/1
10 TABLET ORAL DAILY
Qty: 90 TABLET | Refills: 0 | Status: SHIPPED | OUTPATIENT
Start: 2025-02-14

## 2025-02-14 RX ORDER — MONTELUKAST SODIUM 10 MG/1
10 TABLET ORAL DAILY
Qty: 90 TABLET | Refills: 0 | Status: SHIPPED | OUTPATIENT
Start: 2025-02-14

## 2025-06-23 DIAGNOSIS — F41.1 GENERALIZED ANXIETY DISORDER: ICD-10-CM

## 2025-06-23 DIAGNOSIS — J45.20 MILD INTERMITTENT ASTHMA WITHOUT COMPLICATION (HHS-HCC): ICD-10-CM

## 2025-06-23 RX ORDER — CITALOPRAM 10 MG/1
10 TABLET ORAL DAILY
Qty: 30 TABLET | Refills: 0 | Status: SHIPPED | OUTPATIENT
Start: 2025-06-23

## 2025-06-23 RX ORDER — MONTELUKAST SODIUM 10 MG/1
10 TABLET ORAL DAILY
Qty: 30 TABLET | Refills: 0 | Status: SHIPPED | OUTPATIENT
Start: 2025-06-23

## 2025-07-19 DIAGNOSIS — F41.1 GENERALIZED ANXIETY DISORDER: ICD-10-CM

## 2025-07-21 ENCOUNTER — TELEPHONE (OUTPATIENT)
Dept: PRIMARY CARE | Facility: CLINIC | Age: 45
End: 2025-07-21
Payer: COMMERCIAL

## 2025-07-21 DIAGNOSIS — E78.00 ELEVATED LOW-DENSITY LIPOPROTEIN LEVEL: ICD-10-CM

## 2025-07-21 DIAGNOSIS — D50.9 IRON DEFICIENCY ANEMIA, UNSPECIFIED IRON DEFICIENCY ANEMIA TYPE: ICD-10-CM

## 2025-07-21 RX ORDER — BUPROPION HYDROCHLORIDE 300 MG/1
300 TABLET ORAL
Qty: 30 TABLET | Refills: 0 | Status: SHIPPED | OUTPATIENT
Start: 2025-07-21

## 2025-07-22 PROBLEM — M79.9 DISORDER OF SOFT TISSUE: Status: RESOLVED | Noted: 2020-11-19 | Resolved: 2025-07-22

## 2025-07-24 LAB
ALBUMIN SERPL-MCNC: 3.8 G/DL (ref 3.6–5.1)
ALP SERPL-CCNC: 90 U/L (ref 31–125)
ALT SERPL-CCNC: 16 U/L (ref 6–29)
ANION GAP SERPL CALCULATED.4IONS-SCNC: 11 MMOL/L (CALC) (ref 7–17)
AST SERPL-CCNC: 14 U/L (ref 10–30)
BASOPHILS # BLD AUTO: 63 CELLS/UL (ref 0–200)
BASOPHILS NFR BLD AUTO: 1 %
BILIRUB SERPL-MCNC: 0.3 MG/DL (ref 0.2–1.2)
BUN SERPL-MCNC: 10 MG/DL (ref 7–25)
CALCIUM SERPL-MCNC: 8.8 MG/DL (ref 8.6–10.2)
CHLORIDE SERPL-SCNC: 106 MMOL/L (ref 98–110)
CHOLEST SERPL-MCNC: 194 MG/DL
CHOLEST/HDLC SERPL: 3.2 (CALC)
CO2 SERPL-SCNC: 22 MMOL/L (ref 20–32)
CREAT SERPL-MCNC: 0.89 MG/DL (ref 0.5–0.99)
EGFRCR SERPLBLD CKD-EPI 2021: 82 ML/MIN/1.73M2
EOSINOPHIL # BLD AUTO: 290 CELLS/UL (ref 15–500)
EOSINOPHIL NFR BLD AUTO: 4.6 %
ERYTHROCYTE [DISTWIDTH] IN BLOOD BY AUTOMATED COUNT: 15.4 % (ref 11–15)
GLUCOSE SERPL-MCNC: 90 MG/DL (ref 65–99)
HCT VFR BLD AUTO: 38 % (ref 35–45)
HDLC SERPL-MCNC: 60 MG/DL
HGB BLD-MCNC: 11.8 G/DL (ref 11.7–15.5)
IRON SATN MFR SERPL: 6 % (CALC) (ref 16–45)
IRON SERPL-MCNC: 21 MCG/DL (ref 40–190)
LDLC SERPL CALC-MCNC: 112 MG/DL (CALC)
LYMPHOCYTES # BLD AUTO: 1644 CELLS/UL (ref 850–3900)
LYMPHOCYTES NFR BLD AUTO: 26.1 %
MCH RBC QN AUTO: 24.3 PG (ref 27–33)
MCHC RBC AUTO-ENTMCNC: 31.1 G/DL (ref 32–36)
MCV RBC AUTO: 78.2 FL (ref 80–100)
MONOCYTES # BLD AUTO: 410 CELLS/UL (ref 200–950)
MONOCYTES NFR BLD AUTO: 6.5 %
NEUTROPHILS # BLD AUTO: 3893 CELLS/UL (ref 1500–7800)
NEUTROPHILS NFR BLD AUTO: 61.8 %
NONHDLC SERPL-MCNC: 134 MG/DL (CALC)
PLATELET # BLD AUTO: 248 THOUSAND/UL (ref 140–400)
PMV BLD REES-ECKER: 11.5 FL (ref 7.5–12.5)
POTASSIUM SERPL-SCNC: 4.1 MMOL/L (ref 3.5–5.3)
PROT SERPL-MCNC: 6.6 G/DL (ref 6.1–8.1)
RBC # BLD AUTO: 4.86 MILLION/UL (ref 3.8–5.1)
SODIUM SERPL-SCNC: 139 MMOL/L (ref 135–146)
TIBC SERPL-MCNC: 365 MCG/DL (CALC) (ref 250–450)
TRIGL SERPL-MCNC: 114 MG/DL
WBC # BLD AUTO: 6.3 THOUSAND/UL (ref 3.8–10.8)

## 2025-07-25 ENCOUNTER — HOSPITAL ENCOUNTER (OUTPATIENT)
Dept: RADIOLOGY | Facility: HOSPITAL | Age: 45
Discharge: HOME | End: 2025-07-25
Payer: COMMERCIAL

## 2025-07-25 DIAGNOSIS — Z12.31 SCREENING MAMMOGRAM FOR BREAST CANCER: ICD-10-CM

## 2025-07-25 PROCEDURE — 77063 BREAST TOMOSYNTHESIS BI: CPT

## 2025-07-31 ENCOUNTER — APPOINTMENT (OUTPATIENT)
Dept: PRIMARY CARE | Facility: CLINIC | Age: 45
End: 2025-07-31
Payer: COMMERCIAL

## 2025-07-31 PROBLEM — E78.00 PURE HYPERCHOLESTEROLEMIA: Status: ACTIVE | Noted: 2025-07-31

## 2025-07-31 NOTE — ASSESSMENT & PLAN NOTE
Chronic, needs better control.  Suspect recent decrease in iron is secondary to vaginal bleeding.  Patient should continue following with GYN to determine cause.  She will begin OTC iron supplementation, can start with every other day dosing and move to daily if tolerated.  We discussed symptoms of anemia which prompt emergent follow-up.  Will plan to recheck in 3 months at her physical.

## 2025-07-31 NOTE — PROGRESS NOTES
"Subjective   Patient ID: Kim Arias is a 44 y.o. female who presents for Follow-up.    HPI   HLD: On no medications. Denies chest pain, SOB, nausea, vomiting, myalgias. On Tirzepatide, compounded medication through online. Has been losing weight.   Lab Results   Component Value Date    TRIG 114 07/24/2025    CHOL 194 07/24/2025    LDLCALC 112 (H) 07/24/2025    HDL 60 07/24/2025     The 10-year ASCVD risk score (Danuta WERNER, et al., 2019) is: 0.5%    Values used to calculate the score:      Age: 44 years      Clincally relevant sex: Female      Is Non- : No      Diabetic: No      Tobacco smoker: No      Systolic Blood Pressure: 112 mmHg      Is BP treated: No      HDL Cholesterol: 60 mg/dL      Total Cholesterol: 194 mg/dL    Anxiety: On Wellbutrin XL 300mg, Celexa 10mg. Happy with effects. Denies SI, HI, self-harm.     VENANCIO: Hgb 11.8, iron 21. Reports increase in vaginal bleeding and irregular cycles. On OCP. Seeing GYN on 8/18, planning to do US. This started about 3 weeks ago. Just bought iron supplements, has not started. Some dizziness this morning when she woke up, this resolved. None currently. Some fatigue. Denies chest pain, SOB, hematuria.     Review of Systems  All other systems have been reviewed and are negative except as noted in the HPI.     Objective   /84 (BP Location: Left arm)   Pulse 91   Temp 36.3 °C (97.3 °F) (Temporal)   Ht 1.702 m (5' 7\")   Wt 117 kg (257 lb)   SpO2 98%   BMI 40.25 kg/m²     Physical Exam  Vitals and nursing note reviewed.   Constitutional:       General: She is not in acute distress.     Appearance: Normal appearance.     Eyes:      Extraocular Movements: Extraocular movements intact.      Conjunctiva/sclera: Conjunctivae normal.     Neck:      Vascular: No carotid bruit.     Cardiovascular:      Rate and Rhythm: Normal rate and regular rhythm.   Pulmonary:      Effort: Pulmonary effort is normal.      Breath sounds: Normal breath sounds. "     Musculoskeletal:      Cervical back: Neck supple.      Right lower leg: No edema.      Left lower leg: No edema.     Neurological:      General: No focal deficit present.      Mental Status: She is alert.     Psychiatric:         Mood and Affect: Mood normal.         Assessment/Plan   Assessment & Plan  Iron deficiency anemia, unspecified iron deficiency anemia type  Chronic, needs better control.  Suspect recent decrease in iron is secondary to vaginal bleeding.  Patient should continue following with GYN to determine cause.  She will begin OTC iron supplementation, can start with every other day dosing and move to daily if tolerated.  We discussed symptoms of anemia which prompt emergent follow-up.  Will plan to recheck in 3 months at her physical.         Pure hypercholesterolemia  Chronic.  Keep off medication at this time.  Decrease fast food, fried food, processed foods and large amounts of red meat. Increase lean protein, vegetables and exercise. Recheck in 6-12 months.          Generalized anxiety disorder  Chronic, stable.  Continue Wellbutrin  mg daily, Celexa 10 mg daily.    Orders:    buPROPion XL (Wellbutrin XL) 300 mg 24 hr tablet; Take 1 tablet (300 mg) by mouth early in the morning..

## 2025-07-31 NOTE — ASSESSMENT & PLAN NOTE
Chronic.  Keep off medication at this time.  Decrease fast food, fried food, processed foods and large amounts of red meat. Increase lean protein, vegetables and exercise. Recheck in 6-12 months.

## 2025-08-01 ENCOUNTER — APPOINTMENT (OUTPATIENT)
Dept: PRIMARY CARE | Facility: CLINIC | Age: 45
End: 2025-08-01
Payer: COMMERCIAL

## 2025-08-01 ENCOUNTER — TELEPHONE (OUTPATIENT)
Dept: PRIMARY CARE | Facility: CLINIC | Age: 45
End: 2025-08-01

## 2025-08-01 VITALS
WEIGHT: 257 LBS | HEIGHT: 67 IN | BODY MASS INDEX: 40.34 KG/M2 | SYSTOLIC BLOOD PRESSURE: 112 MMHG | DIASTOLIC BLOOD PRESSURE: 84 MMHG | OXYGEN SATURATION: 98 % | HEART RATE: 91 BPM | TEMPERATURE: 97.3 F

## 2025-08-01 DIAGNOSIS — E78.00 PURE HYPERCHOLESTEROLEMIA: ICD-10-CM

## 2025-08-01 DIAGNOSIS — D50.9 IRON DEFICIENCY ANEMIA, UNSPECIFIED IRON DEFICIENCY ANEMIA TYPE: ICD-10-CM

## 2025-08-01 DIAGNOSIS — D50.9 IRON DEFICIENCY ANEMIA, UNSPECIFIED IRON DEFICIENCY ANEMIA TYPE: Primary | ICD-10-CM

## 2025-08-01 DIAGNOSIS — F41.1 GENERALIZED ANXIETY DISORDER: ICD-10-CM

## 2025-08-01 PROCEDURE — 3008F BODY MASS INDEX DOCD: CPT

## 2025-08-01 PROCEDURE — 99214 OFFICE O/P EST MOD 30 MIN: CPT

## 2025-08-01 RX ORDER — BUPROPION HYDROCHLORIDE 300 MG/1
300 TABLET ORAL
Qty: 90 TABLET | Refills: 1 | Status: SHIPPED | OUTPATIENT
Start: 2025-08-01 | End: 2026-01-28

## 2025-08-01 ASSESSMENT — ANXIETY QUESTIONNAIRES
5. BEING SO RESTLESS THAT IT IS HARD TO SIT STILL: NOT AT ALL
GAD7 TOTAL SCORE: 1
4. TROUBLE RELAXING: NOT AT ALL
7. FEELING AFRAID AS IF SOMETHING AWFUL MIGHT HAPPEN: NOT AT ALL
IF YOU CHECKED OFF ANY PROBLEMS ON THIS QUESTIONNAIRE, HOW DIFFICULT HAVE THESE PROBLEMS MADE IT FOR YOU TO DO YOUR WORK, TAKE CARE OF THINGS AT HOME, OR GET ALONG WITH OTHER PEOPLE: NOT DIFFICULT AT ALL
6. BECOMING EASILY ANNOYED OR IRRITABLE: NOT AT ALL
2. NOT BEING ABLE TO STOP OR CONTROL WORRYING: NOT AT ALL
3. WORRYING TOO MUCH ABOUT DIFFERENT THINGS: NOT AT ALL
1. FEELING NERVOUS, ANXIOUS, OR ON EDGE: SEVERAL DAYS

## 2025-08-01 ASSESSMENT — PATIENT HEALTH QUESTIONNAIRE - PHQ9
SUM OF ALL RESPONSES TO PHQ9 QUESTIONS 1 AND 2: 0
1. LITTLE INTEREST OR PLEASURE IN DOING THINGS: NOT AT ALL
2. FEELING DOWN, DEPRESSED OR HOPELESS: NOT AT ALL

## 2025-08-01 ASSESSMENT — COLUMBIA-SUICIDE SEVERITY RATING SCALE - C-SSRS
1. IN THE PAST MONTH, HAVE YOU WISHED YOU WERE DEAD OR WISHED YOU COULD GO TO SLEEP AND NOT WAKE UP?: NO
2. HAVE YOU ACTUALLY HAD ANY THOUGHTS OF KILLING YOURSELF?: NO
6. HAVE YOU EVER DONE ANYTHING, STARTED TO DO ANYTHING, OR PREPARED TO DO ANYTHING TO END YOUR LIFE?: NO

## 2025-08-01 NOTE — ASSESSMENT & PLAN NOTE
Chronic, stable.  Continue Wellbutrin  mg daily, Celexa 10 mg daily.    Orders:    buPROPion XL (Wellbutrin XL) 300 mg 24 hr tablet; Take 1 tablet (300 mg) by mouth early in the morning..

## 2025-08-25 DIAGNOSIS — F41.1 GENERALIZED ANXIETY DISORDER: ICD-10-CM

## 2025-08-25 RX ORDER — BUPROPION HYDROCHLORIDE 300 MG/1
300 TABLET ORAL
Qty: 30 TABLET | OUTPATIENT
Start: 2025-08-25

## 2025-11-11 ENCOUNTER — APPOINTMENT (OUTPATIENT)
Dept: PRIMARY CARE | Facility: CLINIC | Age: 45
End: 2025-11-11
Payer: COMMERCIAL